# Patient Record
Sex: MALE | Race: WHITE | URBAN - NONMETROPOLITAN AREA
[De-identification: names, ages, dates, MRNs, and addresses within clinical notes are randomized per-mention and may not be internally consistent; named-entity substitution may affect disease eponyms.]

---

## 2017-07-06 ENCOUNTER — AMBULATORY - GICH (OUTPATIENT)
Dept: SCHEDULING | Facility: OTHER | Age: 57
End: 2017-07-06

## 2017-07-07 ENCOUNTER — HISTORY (OUTPATIENT)
Dept: UROLOGY | Facility: OTHER | Age: 57
End: 2017-07-07

## 2017-07-07 ENCOUNTER — AMBULATORY - GICH (OUTPATIENT)
Dept: UROLOGY | Facility: OTHER | Age: 57
End: 2017-07-07

## 2017-12-16 ENCOUNTER — HISTORY (OUTPATIENT)
Dept: EMERGENCY MEDICINE | Facility: OTHER | Age: 57
End: 2017-12-16

## 2017-12-17 ENCOUNTER — HISTORY (OUTPATIENT)
Dept: EMERGENCY MEDICINE | Facility: OTHER | Age: 57
End: 2017-12-17

## 2018-01-29 ENCOUNTER — DOCUMENTATION ONLY (OUTPATIENT)
Dept: FAMILY MEDICINE | Facility: OTHER | Age: 58
End: 2018-01-29

## 2018-01-29 RX ORDER — HYDROCODONE BITARTRATE AND ACETAMINOPHEN 5; 325 MG/1; MG/1
1 TABLET ORAL EVERY 4 HOURS PRN
COMMUNITY
Start: 2017-12-16

## 2018-01-29 RX ORDER — ALBUTEROL SULFATE 90 UG/1
2 AEROSOL, METERED RESPIRATORY (INHALATION) EVERY 6 HOURS PRN
COMMUNITY
Start: 2017-07-07

## 2018-01-29 RX ORDER — ACETAMINOPHEN 325 MG/1
650 TABLET ORAL EVERY 4 HOURS PRN
COMMUNITY
Start: 2017-07-07

## 2018-01-29 RX ORDER — METOPROLOL TARTRATE 50 MG
50 TABLET ORAL 2 TIMES DAILY
COMMUNITY
Start: 2017-07-07

## 2018-01-29 RX ORDER — AMLODIPINE BESYLATE 5 MG/1
5 TABLET ORAL DAILY
COMMUNITY
Start: 2017-07-07

## 2025-01-11 ENCOUNTER — APPOINTMENT (OUTPATIENT)
Dept: CT IMAGING | Facility: OTHER | Age: 65
End: 2025-01-11
Attending: NURSE PRACTITIONER
Payer: COMMERCIAL

## 2025-01-11 ENCOUNTER — APPOINTMENT (OUTPATIENT)
Dept: CT IMAGING | Facility: OTHER | Age: 65
End: 2025-01-11
Attending: FAMILY MEDICINE
Payer: COMMERCIAL

## 2025-01-11 ENCOUNTER — HOSPITAL ENCOUNTER (EMERGENCY)
Facility: OTHER | Age: 65
Discharge: OTHER INSTITUTION | End: 2025-01-11
Attending: FAMILY MEDICINE
Payer: COMMERCIAL

## 2025-01-11 VITALS
HEART RATE: 68 BPM | OXYGEN SATURATION: 99 % | DIASTOLIC BLOOD PRESSURE: 76 MMHG | RESPIRATION RATE: 13 BRPM | TEMPERATURE: 99.4 F | WEIGHT: 165.34 LBS | SYSTOLIC BLOOD PRESSURE: 165 MMHG

## 2025-01-11 DIAGNOSIS — I63.9 ACUTE CVA (CEREBROVASCULAR ACCIDENT) (H): ICD-10-CM

## 2025-01-11 DIAGNOSIS — J96.01 ACUTE RESPIRATORY FAILURE WITH HYPOXIA (H): ICD-10-CM

## 2025-01-11 PROBLEM — R94.31 QT PROLONGATION: Status: ACTIVE | Noted: 2017-07-05

## 2025-01-11 PROBLEM — E11.8 CONTROLLED TYPE 2 DIABETES MELLITUS WITH COMPLICATION, WITHOUT LONG-TERM CURRENT USE OF INSULIN (H): Status: ACTIVE | Noted: 2019-10-27

## 2025-01-11 PROBLEM — R80.9 PROTEINURIA, UNSPECIFIED TYPE: Status: ACTIVE | Noted: 2018-12-11

## 2025-01-11 PROBLEM — G89.29 CHRONIC BILATERAL LOW BACK PAIN WITHOUT SCIATICA: Status: ACTIVE | Noted: 2018-05-08

## 2025-01-11 PROBLEM — I69.398 ABNORMALITY OF GAIT FOLLOWING CEREBROVASCULAR ACCIDENT (CVA): Status: ACTIVE | Noted: 2019-04-04

## 2025-01-11 PROBLEM — N26.1 LEFT RENAL ATROPHY: Status: ACTIVE | Noted: 2018-12-11

## 2025-01-11 PROBLEM — N18.30 CKD (CHRONIC KIDNEY DISEASE) STAGE 3, GFR 30-59 ML/MIN (H): Status: ACTIVE | Noted: 2019-04-01

## 2025-01-11 PROBLEM — G62.9 PERIPHERAL NEUROPATHY: Status: ACTIVE | Noted: 2018-01-05

## 2025-01-11 PROBLEM — M54.50 CHRONIC BILATERAL LOW BACK PAIN WITHOUT SCIATICA: Status: ACTIVE | Noted: 2018-05-08

## 2025-01-11 PROBLEM — J44.9 COPD (CHRONIC OBSTRUCTIVE PULMONARY DISEASE) (H): Status: ACTIVE | Noted: 2019-12-23

## 2025-01-11 PROBLEM — R26.9 ABNORMALITY OF GAIT FOLLOWING CEREBROVASCULAR ACCIDENT (CVA): Status: ACTIVE | Noted: 2019-04-04

## 2025-01-11 PROBLEM — I50.32 CHRONIC DIASTOLIC CONGESTIVE HEART FAILURE (H): Status: ACTIVE | Noted: 2017-10-20

## 2025-01-11 PROBLEM — S36.00XA SPLEEN INJURY: Status: ACTIVE | Noted: 2017-10-13

## 2025-01-11 PROBLEM — D75.89 MACROCYTOSIS WITHOUT ANEMIA: Status: ACTIVE | Noted: 2017-07-05

## 2025-01-11 PROBLEM — I45.2 BIFASCICULAR BUNDLE BRANCH BLOCK: Status: ACTIVE | Noted: 2017-07-05

## 2025-01-11 PROBLEM — G60.8 POLYNEUROPATHY, PERIPHERAL SENSORIMOTOR AXONAL: Status: ACTIVE | Noted: 2025-01-11

## 2025-01-11 LAB
ABO + RH BLD: NORMAL
ALBUMIN SERPL BCG-MCNC: 4.5 G/DL (ref 3.5–5.2)
ALBUMIN UR-MCNC: 20 MG/DL
ALP SERPL-CCNC: 105 U/L (ref 40–150)
ALT SERPL W P-5'-P-CCNC: 101 U/L (ref 0–70)
AMMONIA PLAS-SCNC: 26 UMOL/L (ref 16–60)
AMPHETAMINES UR QL SCN: NORMAL
ANION GAP SERPL CALCULATED.3IONS-SCNC: 13 MMOL/L (ref 7–15)
APPEARANCE UR: CLEAR
APTT PPP: 22 SECONDS (ref 22–38)
AST SERPL W P-5'-P-CCNC: 73 U/L (ref 0–45)
BARBITURATES UR QL SCN: NORMAL
BASOPHILS # BLD AUTO: 0 10E3/UL (ref 0–0.2)
BASOPHILS NFR BLD AUTO: 0 %
BENZODIAZ UR QL SCN: NORMAL
BILIRUB SERPL-MCNC: 0.5 MG/DL
BILIRUB UR QL STRIP: NEGATIVE
BLD GP AB SCN SERPL QL: NEGATIVE
BUN SERPL-MCNC: 29.3 MG/DL (ref 8–23)
BZE UR QL SCN: NORMAL
CALCIUM SERPL-MCNC: 8.5 MG/DL (ref 8.8–10.4)
CANNABINOIDS UR QL SCN: NORMAL
CHLORIDE SERPL-SCNC: 97 MMOL/L (ref 98–107)
COLOR UR AUTO: ABNORMAL
CREAT SERPL-MCNC: 1.54 MG/DL (ref 0.67–1.17)
EGFRCR SERPLBLD CKD-EPI 2021: 50 ML/MIN/1.73M2
EOSINOPHIL # BLD AUTO: 0.1 10E3/UL (ref 0–0.7)
EOSINOPHIL NFR BLD AUTO: 1 %
ERYTHROCYTE [DISTWIDTH] IN BLOOD BY AUTOMATED COUNT: 14.3 % (ref 10–15)
ETHANOL SERPL-MCNC: <0.01 G/DL
FENTANYL UR QL: NORMAL
GLUCOSE BLDC GLUCOMTR-MCNC: 173 MG/DL (ref 70–99)
GLUCOSE SERPL-MCNC: 191 MG/DL (ref 70–99)
GLUCOSE UR STRIP-MCNC: NEGATIVE MG/DL
HCO3 SERPL-SCNC: 25 MMOL/L (ref 22–29)
HCT VFR BLD AUTO: 42.4 % (ref 40–53)
HGB BLD-MCNC: 14.4 G/DL (ref 13.3–17.7)
HGB UR QL STRIP: NEGATIVE
HOLD SPECIMEN: NORMAL
IMM GRANULOCYTES # BLD: 0 10E3/UL
IMM GRANULOCYTES NFR BLD: 0 %
INR PPP: 0.95 (ref 0.85–1.15)
KETONES UR STRIP-MCNC: NEGATIVE MG/DL
LACTATE SERPL-SCNC: 0.7 MMOL/L (ref 0.7–2)
LEUKOCYTE ESTERASE UR QL STRIP: NEGATIVE
LIPASE SERPL-CCNC: 31 U/L (ref 13–60)
LYMPHOCYTES # BLD AUTO: 1.6 10E3/UL (ref 0.8–5.3)
LYMPHOCYTES NFR BLD AUTO: 16 %
MAGNESIUM SERPL-MCNC: 1.6 MG/DL (ref 1.7–2.3)
MCH RBC QN AUTO: 33.8 PG (ref 26.5–33)
MCHC RBC AUTO-ENTMCNC: 34 G/DL (ref 31.5–36.5)
MCV RBC AUTO: 100 FL (ref 78–100)
MONOCYTES # BLD AUTO: 0.5 10E3/UL (ref 0–1.3)
MONOCYTES NFR BLD AUTO: 5 %
NEUTROPHILS # BLD AUTO: 7.8 10E3/UL (ref 1.6–8.3)
NEUTROPHILS NFR BLD AUTO: 78 %
NITRATE UR QL: NEGATIVE
NRBC # BLD AUTO: 0 10E3/UL
NRBC BLD AUTO-RTO: 0 /100
OPIATES UR QL SCN: NORMAL
PCP QUAL URINE (ROCHE): NORMAL
PH UR STRIP: 6 [PH] (ref 5–9)
PLATELET # BLD AUTO: 112 10E3/UL (ref 150–450)
POTASSIUM SERPL-SCNC: 4.4 MMOL/L (ref 3.4–5.3)
PROT SERPL-MCNC: 7.4 G/DL (ref 6.4–8.3)
RBC # BLD AUTO: 4.26 10E6/UL (ref 4.4–5.9)
RBC URINE: 1 /HPF
SODIUM SERPL-SCNC: 135 MMOL/L (ref 135–145)
SP GR UR STRIP: 1.02 (ref 1–1.03)
SPECIMEN EXP DATE BLD: NORMAL
TROPONIN T SERPL HS-MCNC: 29 NG/L
UROBILINOGEN UR STRIP-MCNC: NORMAL MG/DL
WBC # BLD AUTO: 10 10E3/UL (ref 4–11)
WBC URINE: <1 /HPF

## 2025-01-11 PROCEDURE — 31500 INSERT EMERGENCY AIRWAY: CPT | Performed by: FAMILY MEDICINE

## 2025-01-11 PROCEDURE — 82140 ASSAY OF AMMONIA: CPT | Performed by: FAMILY MEDICINE

## 2025-01-11 PROCEDURE — 82077 ASSAY SPEC XCP UR&BREATH IA: CPT | Performed by: FAMILY MEDICINE

## 2025-01-11 PROCEDURE — 250N000011 HC RX IP 250 OP 636: Performed by: FAMILY MEDICINE

## 2025-01-11 PROCEDURE — 84484 ASSAY OF TROPONIN QUANT: CPT | Performed by: NURSE PRACTITIONER

## 2025-01-11 PROCEDURE — 70496 CT ANGIOGRAPHY HEAD: CPT

## 2025-01-11 PROCEDURE — 36415 COLL VENOUS BLD VENIPUNCTURE: CPT | Performed by: FAMILY MEDICINE

## 2025-01-11 PROCEDURE — 83605 ASSAY OF LACTIC ACID: CPT | Performed by: FAMILY MEDICINE

## 2025-01-11 PROCEDURE — 99291 CRITICAL CARE FIRST HOUR: CPT | Mod: 25 | Performed by: FAMILY MEDICINE

## 2025-01-11 PROCEDURE — 250N000009 HC RX 250: Performed by: NURSE PRACTITIONER

## 2025-01-11 PROCEDURE — 85610 PROTHROMBIN TIME: CPT | Performed by: NURSE PRACTITIONER

## 2025-01-11 PROCEDURE — 83690 ASSAY OF LIPASE: CPT | Performed by: FAMILY MEDICINE

## 2025-01-11 PROCEDURE — 82040 ASSAY OF SERUM ALBUMIN: CPT | Performed by: FAMILY MEDICINE

## 2025-01-11 PROCEDURE — 87486 CHLMYD PNEUM DNA AMP PROBE: CPT | Performed by: FAMILY MEDICINE

## 2025-01-11 PROCEDURE — 250N000011 HC RX IP 250 OP 636: Performed by: NURSE PRACTITIONER

## 2025-01-11 PROCEDURE — 96365 THER/PROPH/DIAG IV INF INIT: CPT | Mod: XU | Performed by: FAMILY MEDICINE

## 2025-01-11 PROCEDURE — 96368 THER/DIAG CONCURRENT INF: CPT | Mod: XU | Performed by: FAMILY MEDICINE

## 2025-01-11 PROCEDURE — 70450 CT HEAD/BRAIN W/O DYE: CPT

## 2025-01-11 PROCEDURE — 86900 BLOOD TYPING SEROLOGIC ABO: CPT | Performed by: FAMILY MEDICINE

## 2025-01-11 PROCEDURE — 85730 THROMBOPLASTIN TIME PARTIAL: CPT | Performed by: NURSE PRACTITIONER

## 2025-01-11 PROCEDURE — 80048 BASIC METABOLIC PNL TOTAL CA: CPT | Performed by: FAMILY MEDICINE

## 2025-01-11 PROCEDURE — 85025 COMPLETE CBC W/AUTO DIFF WBC: CPT | Performed by: FAMILY MEDICINE

## 2025-01-11 PROCEDURE — 87581 M.PNEUMON DNA AMP PROBE: CPT | Performed by: FAMILY MEDICINE

## 2025-01-11 PROCEDURE — 250N000009 HC RX 250: Performed by: FAMILY MEDICINE

## 2025-01-11 PROCEDURE — 86850 RBC ANTIBODY SCREEN: CPT | Performed by: FAMILY MEDICINE

## 2025-01-11 PROCEDURE — 93005 ELECTROCARDIOGRAM TRACING: CPT | Mod: XU | Performed by: FAMILY MEDICINE

## 2025-01-11 PROCEDURE — 71260 CT THORAX DX C+: CPT

## 2025-01-11 PROCEDURE — 83735 ASSAY OF MAGNESIUM: CPT | Performed by: FAMILY MEDICINE

## 2025-01-11 PROCEDURE — 36415 COLL VENOUS BLD VENIPUNCTURE: CPT | Performed by: NURSE PRACTITIONER

## 2025-01-11 PROCEDURE — 87633 RESP VIRUS 12-25 TARGETS: CPT | Performed by: FAMILY MEDICINE

## 2025-01-11 PROCEDURE — 258N000003 HC RX IP 258 OP 636: Performed by: FAMILY MEDICINE

## 2025-01-11 PROCEDURE — 82962 GLUCOSE BLOOD TEST: CPT

## 2025-01-11 PROCEDURE — 93010 ELECTROCARDIOGRAM REPORT: CPT | Performed by: INTERNAL MEDICINE

## 2025-01-11 PROCEDURE — 80307 DRUG TEST PRSMV CHEM ANLYZR: CPT | Performed by: FAMILY MEDICINE

## 2025-01-11 PROCEDURE — 81001 URINALYSIS AUTO W/SCOPE: CPT | Performed by: FAMILY MEDICINE

## 2025-01-11 RX ORDER — PROPOFOL 10 MG/ML
5-75 INJECTION, EMULSION INTRAVENOUS CONTINUOUS
Status: DISCONTINUED | OUTPATIENT
Start: 2025-01-11 | End: 2025-01-11 | Stop reason: HOSPADM

## 2025-01-11 RX ORDER — AZITHROMYCIN 500 MG/5ML
500 INJECTION, POWDER, LYOPHILIZED, FOR SOLUTION INTRAVENOUS ONCE
Status: COMPLETED | OUTPATIENT
Start: 2025-01-11 | End: 2025-01-11

## 2025-01-11 RX ORDER — ETOMIDATE 2 MG/ML
20 INJECTION INTRAVENOUS ONCE
Status: COMPLETED | OUTPATIENT
Start: 2025-01-11 | End: 2025-01-11

## 2025-01-11 RX ORDER — IOPAMIDOL 755 MG/ML
67 INJECTION, SOLUTION INTRAVASCULAR ONCE
Status: COMPLETED | OUTPATIENT
Start: 2025-01-11 | End: 2025-01-11

## 2025-01-11 RX ORDER — ACETAMINOPHEN 325 MG/1
650 TABLET ORAL EVERY 4 HOURS PRN
Status: DISCONTINUED | OUTPATIENT
Start: 2025-01-11 | End: 2025-01-11 | Stop reason: HOSPADM

## 2025-01-11 RX ORDER — ACETAMINOPHEN 650 MG/1
650 SUPPOSITORY RECTAL EVERY 4 HOURS PRN
Status: DISCONTINUED | OUTPATIENT
Start: 2025-01-11 | End: 2025-01-11 | Stop reason: HOSPADM

## 2025-01-11 RX ORDER — PIPERACILLIN SODIUM, TAZOBACTAM SODIUM 3; .375 G/15ML; G/15ML
3.38 INJECTION, POWDER, LYOPHILIZED, FOR SOLUTION INTRAVENOUS ONCE
Status: COMPLETED | OUTPATIENT
Start: 2025-01-11 | End: 2025-01-11

## 2025-01-11 RX ADMIN — PROPOFOL 10 MCG/KG/MIN: 10 INJECTION, EMULSION INTRAVENOUS at 12:14

## 2025-01-11 RX ADMIN — AZITHROMYCIN MONOHYDRATE 500 MG: 500 INJECTION, POWDER, LYOPHILIZED, FOR SOLUTION INTRAVENOUS at 12:36

## 2025-01-11 RX ADMIN — IOPAMIDOL 75 ML: 755 INJECTION, SOLUTION INTRAVENOUS at 12:02

## 2025-01-11 RX ADMIN — ROCURONIUM BROMIDE 50 MG: 10 INJECTION, SOLUTION INTRAVENOUS at 11:34

## 2025-01-11 RX ADMIN — PIPERACILLIN AND TAZOBACTAM 3.38 G: 3; .375 INJECTION, POWDER, LYOPHILIZED, FOR SOLUTION INTRAVENOUS at 12:12

## 2025-01-11 RX ADMIN — SODIUM CHLORIDE 100 ML: 9 INJECTION, SOLUTION INTRAVENOUS at 12:03

## 2025-01-11 RX ADMIN — ETOMIDATE 20 MG: 2 INJECTION, SOLUTION INTRAVENOUS at 11:33

## 2025-01-11 ASSESSMENT — ACTIVITIES OF DAILY LIVING (ADL)
ADLS_ACUITY_SCORE: 41
ADLS_ACUITY_SCORE: 41

## 2025-01-11 NOTE — CONSULTS
"  [facility]    Stroke Telephone Note    I was called by Saida Reddy on 01/11/25 regarding patient Josué Seymour. The patient is a 64 year old male smoker, HTN, HLD, sp right carotid endarterectomy presents with difficulty breathing AMS and left sided weakness. Code stroke activated . CTH : hpodensiti noted on the ri MCA EULALIO territory. CTA right ICA occlusion, new from 2019. This in association with the cortical subcortical loss of differentiation on the right hemisphere brings concern for acute ICA occlusion. Patient needs to be transfer to a MT capable center for intervention.      Vitals  BP: (!) 187/91   Pulse: 90   Resp: 25   Temp: 99.4  F (37.4  C)   Weight: 75 kg (165 lb 5.5 oz)        Impression  Sudden onset AMS, Left sided weakness. MARIA occlusion     Recommendations  Ctp STAT  Emergent Transfer to MT capable center for MT consideration. Patient being transfer to [place] for respiratory condition. Needs evaluation for MT consideration urgently   Avoid hypotension, Keep MAP > 80  Permissive BP control       My recommendations are based on the information provided over the phone by Josué Seymour's in-person providers. They are not intended to replace the clinical judgment of his in-person providers. I was not requested to personally see or examine the patient at this time.     Severino Bhatti MD  Vascular Neurology    To page me or covering stroke neurology team member, click here: [facility]  Choose \"On Call\" tab at top, then select \"NEUROLOGY/ALL SITES\" from middle drop-down box, press Enter, then look for \"stroke\" or \"telestroke\" for your site.    "

## 2025-01-11 NOTE — ED TRIAGE NOTES
ED Nursing Triage Note (General)   ________________________________    Josué Seymour is a 64 year old Male that presents to triage via [facility] with complaints of hypoxia.  EMS arrived on scene with an 02 of 42%.  Patient placed on a high flow non-rebreather and given a duoneb in route which brought 02 to the 90's.  Patient remains lethargic with no pain response to the L) side.  Patient is unable to answer questions on arrival.  Hx of COPD, HIV, and hepatitis per EMS.   Significant symptoms had onset 1 hour(s) ago.  Vital signs:  Temp: (!) 100.7  F (38.2  C) Temp src: Tympanic BP: (!) 187/91 Pulse: 90   Resp: 25 SpO2: 100 %       Weight: 75 kg (165 lb 5.5 oz)  There is no height or weight on file to calculate BMI.       PRE HOSPITAL PRIOR LIVING SITUATION Alone      Triage Assessment (Adult)       Row Name 01/11/25 1120          Triage Assessment    Airway WDL X        Respiratory WDL    Respiratory WDL X        Skin Circulation/Temperature WDL    Skin Circulation/Temperature WDL X        Cardiac WDL    Cardiac WDL WDL        Peripheral/Neurovascular WDL    Peripheral Neurovascular WDL X        Cognitive/Neuro/Behavioral WDL    Cognitive/Neuro/Behavioral WDL X     Level of Consciousness confused;lethargic        [place] Coma Scale    Best Eye Response 2-->(E2) to pain     Best Motor Response 4-->(M4) withdraws from pain     Best Verbal Response 3-->(V3) inappropriate words     Jaime Coma Scale Score 9

## 2025-01-11 NOTE — PROGRESS NOTES
RT assisted in intubation. Pt was intubated with an 8.0 ETT placed 24 cm at the lips. Colorimetry, end tidal, lung auscultation, and CXR used to verify tube placement. RT manually ventilated pt to CT scan and back. When pt got back to ER, RT continued to manually ventilate until transport team placed pt on their ventilator. No further interventions.    RT Сергей

## 2025-01-11 NOTE — ED NOTES
Upon arrival, patient is unresponsive to painful stimuli.  Unable to perform any neurologic assessment.  Plans for intubation.  Nursing, RT, MD, lab, pharmacy and imaging in room.

## 2025-01-11 NOTE — ED NOTES
Attempted to contact patient's brother Choco to let him know of patient's disposition.  No response on either number provided.  Per medics, brother was on scene when patient transferred but that the two were estranged.  If patient's brother calls back, limited information would be given as consent was not able to be obtained from patient to share information due to patient's medical condition.

## 2025-01-11 NOTE — ED NOTES
Patient arrived to ER unresponsive to painful stimuli at 1120 via EMS; he was not moving any extremities, no verbal output.  .  Tylenol given IV en route to our facility.  Difficulty initially with IV placement.  Patient wheezing and labored breathing.  He has facial flushing.  At 1135, patient was successfully intubated without difficulty.  20 of etomidate and 50 of ben given for sedation.  Tube is 24 at the lip, 8.0cm.  LS more dim to the right than to the left but wheezing heard prominently on the right.  Pulses bounding.  At 1137, patient transferred to CT.  After completion of imaging, VS remained stable and norris catheter was placed with only 125 of clear urine noted in norris.  At 1230, Flight Crew in room.  Zithromax handed off to flight crew to be infused.  Patient opening eyes and propofol drip increased to 30mcg/kg/hr.  RESP: CMV, tidal volume 475, rate of 14,and an FiO2 of 50 via flight crew prior to transfer. No changes of neurologic status throughout patient's stay.

## 2025-01-11 NOTE — ED PROVIDER NOTES
History     Chief Complaint   Patient presents with    Altered Mental Status    Shortness of Breath     HPI  Josué Seymour is a 64 year old male who presented obtunded in respiratory distress.  GCS 6 on arrival.  Patient was immediately intubated.  Unable to obtain history though we have been told he has COPD.  He was hypoxic in the 40% range when EMS found him.  There is also concern as he has not been responding much on the left side if he possibly had a stroke.  Given concerns for all the above after he was intubated he was immediately sent to the CT scanner under tier 1 stroke code.  I have a call out to [facility] for transfer to higher level of care at this time.    Patient had been given 2 albuterol, 1 DuoNeb, 125 mg of Solu-Medrol and was on 8 L of oxygen and route.  His sats improved to the 99% range but he remained obtunded as noted above.    Sepsis Evaluation     I was called to see Josué Seymour due to abnormal vital signs triggering the Sepsis SIRS screening alert. He is known to have an infection.     PHYSICAL EXAM  Vital Signs:  Temp: 99.4  F (37.4  C) Temp src: Tympanic BP: (!) 187/91 Pulse: 90   Resp: 25 SpO2: 100 %        General: acutely ill appearing  Mental Status: altered level of consciousness based on confused, altered .     Remainder of physical exam is significant for coarse lung sounds, rales, respiratory distress    ASSESSMENT AND PLAN    The patient has signs of sepsis as evidenced by:  1. Presence of 2 SIRS criteria, suspected infection, AND  2. Organ dysfunction: Acute encephalopathy due to sepsis and Acute respiratory or ventilatory failure with new need for positive pressure ventilation due to sepsis    Time zero: 12:04 PM  on 01/11/25 as this was the time when    resulted, raising suspicion for bacterial infection.    Note: Due to a national blood culture bottle shortage, reduced blood cultures may have been drawn on this patient.    Lactic Acid Results:  Recent Labs   Lab Test  "01/11/25  1145   LACT 0.7      BMI Readings from Last 1 Encounters:   No data found for BMI      Anti-infectives (From now, onward)      Start     Dose/Rate Route Frequency Ordered Stop    01/11/25 1230  piperacillin-tazobactam (ZOSYN) 3.375 g vial to attach to  mL bag        Note to Pharmacy: For [facility], [facility] and [facility]: For Zosyn-naive patients, use the \"Zosyn initial dose + extended infusion\" order panel.    3.375 g  over 30 Minutes Intravenous ONCE 01/11/25 1145      01/11/25 1230  azithromycin (ZITHROMAX) 500 mg in  mL intermittent infusion         500 mg  over 1 Hours Intravenous ONCE 01/11/25 1145              3 Hour Bundle  Blood Cultures before IV Antibiotics: Yes  Current antibiotic coverage is appropriate for source of infection.  Total fluids given (ED +Pre-hospital):  The patient responded to a lesser volume of IV fluids. The initial volume ordered was 1000 mL.     6 Hour Bundle (Reassessment)  Repeat Lactic Acid Level: Ordered by reflex for 2 hours after initial lactic acid collection.  Vasopressors: MAP>65 after initial IVF bolus, will continue to monitor fluid status and vital signs.  Repeat perfusion exam:  I attest to having performed a repeat sepsis exam and assessment of perfusion at .now.    Disposition: The patient will remain on the current unit. We will continue to monitor this patient closely..      Saida Reddy DO  01/11/25, 11:53 AM      Allergies:  Not on File    Problem List:    Patient Active Problem List    Diagnosis Date Noted    Polyneuropathy, peripheral sensorimotor axonal 01/11/2025     Priority: Medium    COPD (chronic obstructive pulmonary disease) (H) 12/23/2019     Priority: Medium     4/3/2019 Hospital discharge summary:  Patient has Chronic obstructive pulmonary disease/cough and tobacco abuse. He is ready to quit Start with the nicotine patch.  Consider chantix if unsuccessful. He has an albuterol MDI. CXR was negative      Controlled type 2 diabetes mellitus with " complication, without long-term current use of insulin (H) 10/27/2019     Priority: Medium    Abnormality of gait following cerebrovascular accident (CVA) 04/04/2019     Priority: Medium    CKD (chronic kidney disease) stage 3, GFR 30-59 ml/min (H) 04/01/2019     Priority: Medium    CVA (cerebral vascular accident) (H) 04/01/2019     Priority: Medium    Proteinuria, unspecified type 12/11/2018     Priority: Medium    Left renal atrophy 12/11/2018     Priority: Medium    Chronic bilateral low back pain without sciatica 05/08/2018     Priority: Medium    Peripheral neuropathy 01/05/2018     Priority: Medium    Chronic diastolic congestive heart failure (H) 10/20/2017     Priority: Medium    Spleen injury 10/13/2017     Priority: Medium     Possible grade 1      Bifascicular bundle branch block 07/05/2017     Priority: Medium    QT prolongation 07/05/2017     Priority: Medium    Macrocytosis without anemia 07/05/2017     Priority: Medium    HTN (hypertension) 11/05/2014     Priority: Medium        Past Medical History:    Past Medical History:   Diagnosis Date    Abnormal electrocardiogram     Abnormal levels of other serum enzymes     Abnormality of plasma protein     Acute pancreatitis without infection or necrosis     Bifascicular block     Calculus of gallbladder without cholecystitis without obstruction     Cerebral infarction (H)     Dehydration     Epigastric pain     Essential (primary) hypertension     Hematuria     Hypertensive urgency     Hypokalemia     Malignant neoplasm of bladder (H)     Muscle spasm of back     Nicotine dependence, uncomplicated     Other specified abnormal findings of blood chemistry     Other specified abnormal findings of blood chemistry     Other specified diseases of blood and blood-forming organs     Pure hyperglyceridemia     Restless legs syndrome        Past Surgical History:    Past Surgical History:   Procedure Laterality Date    OTHER SURGICAL HISTORY       2014,[id number],BLADDER SURGERY,tumor resection-- records from [facility]    OTHER SURGICAL HISTORY      02/27/2015,[id number],CAROTID ENDARTERECTOMY,Right,Records from [facility]    OTHER SURGICAL HISTORY      08/28/2009,80554.0,[place] EGD TRANSORAL DIAGNOSTIC,records from [facility]    OTHER SURGICAL HISTORY      08/03/2009,48252.0,[place] LAP CHOLECYSTECTOMY/GRAPH,[facility]    TONSILLECTOMY      Records from [facility]       Family History:    No family history on file.    Social History:  Marital Status:   [2]  Social History     Tobacco Use    Smoking status: Former    Smokeless tobacco: Never   Substance Use Topics    Alcohol use: No    Drug use: Unknown     Types: Other     Comment: Drug use: No        Medications:    acetaminophen (TYLENOL) 325 MG tablet  albuterol (PROAIR HFA/PROVENTIL HFA/VENTOLIN HFA) 108 (90 BASE) MCG/ACT Inhaler  amLODIPine (NORVASC) 5 MG tablet  aspirin  MG EC tablet  HYDROcodone-acetaminophen (NORCO) 5-325 MG per tablet  metoprolol tartrate (LOPRESSOR) 50 MG tablet  ranitidine (ZANTAC) 300 MG tablet          Review of Systems   Unable to perform ROS: Patient unresponsive       Physical Exam   BP: (!) 187/91  Pulse: 90  Temp: (!) 100.7  F (38.2  C)  Resp: 25  Weight: 75 kg (165 lb 5.5 oz)  SpO2: 100 %      Physical Exam  Vitals and nursing note reviewed.   Constitutional:       General: He is in acute distress.      Appearance: He is ill-appearing.      Interventions: He is intubated.      Comments: Acutely and chronically ill-appearing with respiratory distress.   HENT:      Head: Normocephalic and atraumatic.   Eyes:      Pupils: Pupils are equal, round, and reactive to light.   Cardiovascular:      Rate and Rhythm: Normal rate.   Pulmonary:      Effort: Tachypnea, accessory muscle usage, prolonged expiration, respiratory distress and retractions present. He is intubated.      Breath sounds: Decreased air movement present. Decreased breath sounds and wheezing present. No rhonchi or rales.    Musculoskeletal:      Cervical back: Neck supple.   Neurological:      Mental Status: He is confused and unresponsive.      GCS: GCS eye subscore is 2. GCS verbal subscore is 1.      Motor: Weakness present. No tremor.         ED Course     ED Course as of 01/11/25 1241   Sat Jan 11, 2025   1138 Pt obtunded. RSI and intubated with 8.0 ETT. Sent to CT scanner   1145 Will give a dose of antibiotics at this time.  Blood cultures have been drawn.   1149 Spoke with [facility]. They have beds available. Will awaiting labs/imaging and call back. Flight available.   1231 Call from Rad and stroke neuro. Concern for acute occlusion causing symptoms. Recommend IR and higher level of care at this time.      Procedures              EKG Interpretation:      Interpreted by Saida Reddy DO  Time reviewed:   Symptoms at time of EKG: respiratory distress   Rate: Normal  Axis: Left Axis Deviation  Ectopy: premature ventricular contractions (unifocal)  Conduction: bifasicular  ST Segments/ T Waves: No acute ischemic changes  Comparison to prior: No old EKG available            Critical Care time:  was 90 minutes for this patient excluding procedures.     The patient has signs of sepsis   Sepsis ED evaluation   The patient has signs of sepsis as evidenced by:  1. Presence of 2 SIRS criteria, suspected infection, AND  2. Organ dysfunction: Acute encephalopathy due to sepsis and Acute respiratory or ventilatory failure with new need for positive pressure ventilation due to sepsis      Lactic Acid Results:  Recent Labs   Lab Test 01/11/25  1145   LACT 0.7       3 Hour Bundle 6 Hour Bundle (Reassessment)   Blood Cultures before IV Antibiotics: Yes  Antibiotics given: see below  Prehospital fluid volume (mL):                     Total fluids given (ED +Pre-hospital):  The patient responded to a lesser volume of IV fluids. The initial volume ordered was 1000 mL.    Repeat Lactic Acid Level: Ordered by reflex for 2 hours after initial  "lactic acid collection.  Vasopressors: MAP>65 after initial IVF bolus, will continue to monitor fluid status and vital signs.  Repeat perfusion exam: I attest to having performed a repeat sepsis exam and assessment of perfusion at 12:09 PM .   BMI Readings from Last 1 Encounters:   No data found for BMI       Anti-infectives (From admission through now)      Start     Dose/Rate Route Frequency Ordered Stop    01/11/25 1230  piperacillin-tazobactam (ZOSYN) 3.375 g vial to attach to  mL bag        Note to Pharmacy: For [facility], [facility] and [facility]: For Zosyn-naive patients, use the \"Zosyn initial dose + extended infusion\" order panel.    3.375 g  over 30 Minutes Intravenous ONCE 01/11/25 1145      01/11/25 1230  azithromycin (ZITHROMAX) 500 mg in  mL intermittent infusion         500 mg  over 1 Hours Intravenous ONCE 01/11/25 1145               and The patient has stroke symptoms:         ED Stroke specific documentation           NIHSS PDF     Patient last known well time: 1045am      Thrombolytics:   Not given due to:   - established infarct on imaging    If treating with thrombolytics: Ensure SBP<180 and DBP<105 prior to treatment with thrombolytics.  Administering thrombolytics after treatment with IV labetalol, hydralazine, or nicardipine is reasonable once BP control is established.    Endovascular Retrieval:  Will need transfer    [employer] Stroke Scale (Baseline). Unable to assess. GCS 6.     Stroke Mimics were considered (including migraine headache, seizure disorder, hypoglycemia (or hyperglycemia), head or spinal trauma, CNS infection, Toxin ingestion and shock state (e.g. sepsis) .    Evaluation/Treatement was delayed due to: need for CT results, intubation of unstable patient. Need to transfer to higher level of care for treatment.             Results for orders placed or performed during the hospital encounter of 01/11/25 (from the past 24 hours)   Glucose by meter   Result Value Ref " Range    GLUCOSE BY METER POCT 173 (H) 70 - 99 mg/dL   CBC with platelets differential    Narrative    The following orders were created for panel order CBC with platelets differential.  Procedure                               Abnormality         Status                     ---------                               -----------         ------                     CBC with platelets and d...[[id number]]  Abnormal            Final result                 Please view results for these tests on the individual orders.   Lactic acid whole blood with 1x repeat in 2 hr when >2   Result Value Ref Range    Lactic Acid, Initial 0.7 0.7 - 2.0 mmol/L   Ammonia   Result Value Ref Range    Ammonia 26 16 - 60 umol/L   INR   Result Value Ref Range    INR 0.95 0.85 - 1.15   Partial thromboplastin time   Result Value Ref Range    aPTT 22 22 - 38 Seconds   Troponin T, High Sensitivity   Result Value Ref Range    Troponin T, High Sensitivity 29 (H) <=22 ng/L   CBC with platelets and differential   Result Value Ref Range    WBC Count 10.0 4.0 - 11.0 10e3/uL    RBC Count 4.26 (L) 4.40 - 5.90 10e6/uL    Hemoglobin 14.4 13.3 - 17.7 g/dL    Hematocrit 42.4 40.0 - 53.0 %     78 - 100 fL    MCH 33.8 (H) 26.5 - 33.0 pg    MCHC 34.0 31.5 - 36.5 g/dL    RDW 14.3 10.0 - 15.0 %    Platelet Count 112 (L) 150 - 450 10e3/uL    % Neutrophils 78 %    % Lymphocytes 16 %    % Monocytes 5 %    % Eosinophils 1 %    % Basophils 0 %    % Immature Granulocytes 0 %    NRBCs per 100 WBC 0 <1 /100    Absolute Neutrophils 7.8 1.6 - 8.3 10e3/uL    Absolute Lymphocytes 1.6 0.8 - 5.3 10e3/uL    Absolute Monocytes 0.5 0.0 - 1.3 10e3/uL    Absolute Eosinophils 0.1 0.0 - 0.7 10e3/uL    Absolute Basophils 0.0 0.0 - 0.2 10e3/uL    Absolute Immature Granulocytes 0.0 <=0.4 10e3/uL    Absolute NRBCs 0.0 10e3/uL   Extra Tube    Narrative    The following orders were created for panel order Extra Tube.  Procedure                               Abnormality         Status                      ---------                               -----------         ------                     Extra Red Top Tube[[id number]]                               In process                   Please view results for these tests on the individual orders.   UA with Microscopic reflex to Culture    Specimen: Urine, Catheter   Result Value Ref Range    Color Urine Light Yellow Colorless, Straw, Light Yellow, Yellow    Appearance Urine Clear Clear    Glucose Urine Negative Negative mg/dL    Bilirubin Urine Negative Negative    Ketones Urine Negative Negative mg/dL    Specific Gravity Urine 1.016 1.000 - 1.030    Blood Urine Negative Negative    pH Urine 6.0 5.0 - 9.0    Protein Albumin Urine 20 (A) Negative mg/dL    Urobilinogen Urine Normal Normal, 2.0 mg/dL    Nitrite Urine Negative Negative    Leukocyte Esterase Urine Negative Negative    RBC Urine 1 <=2 /HPF    WBC Urine <1 <=5 /HPF    Narrative    Urine Culture not indicated   Urine Drug Screen    Narrative    The following orders were created for panel order Urine Drug Screen.  Procedure                               Abnormality         Status                     ---------                               -----------         ------                     Urine Drug Screen Panel[[id number]]      Normal              Final result                 Please view results for these tests on the individual orders.   Urine Drug Screen Panel   Result Value Ref Range    Amphetamines Urine Screen Negative Screen Negative    Barbituates Urine Screen Negative Screen Negative    Benzodiazepine Urine Screen Negative Screen Negative    Cannabinoids Urine Screen Negative Screen Negative    Cocaine Urine Screen Negative Screen Negative    Fentanyl Qual Urine Screen Negative Screen Negative    Opiates Urine Screen Negative Screen Negative    PCP Urine Screen Negative Screen Negative       Medications   acetaminophen (TYLENOL) tablet 650 mg (has no administration in time range)     Or    acetaminophen (TYLENOL) Suppository 650 mg (has no administration in time range)   piperacillin-tazobactam (ZOSYN) 3.375 g vial to attach to  mL bag (3.375 g Intravenous $New Bag 1/11/25 1212)   azithromycin (ZITHROMAX) 500 mg in  mL intermittent infusion (500 mg Intravenous $New Bag 1/11/25 1236)   propofol (DIPRIVAN) infusion (30 mcg/kg/min × 75 kg Intravenous Rate/Dose Change 1/11/25 1234)   etomidate (AMIDATE) injection 20 mg (20 mg Intravenous $Given 1/11/25 1133)   rocuronium injection 50 mg (50 mg Intravenous $Given 1/11/25 1134)   iopamidol (ISOVUE-370) solution 67 mL (75 mLs Intravenous $Given 1/11/25 1202)     And   sodium chloride 0.9 % bag for CT scan flush use (100 mLs As instructed $Given 1/11/25 1203)       Assessments & Plan (with Medical Decision Making)     I have reviewed the nursing notes.    I have reviewed the findings, diagnosis, plan and need for follow up with the patient.     Critical Care Addendum  My initial assessment, based on my review of prehospital provider report, review of nursing observations, review of vital signs, focused history, physical exam, review of cardiac rhythm monitor, 12 lead ECG analysis, and discussion with neurosurg/neurology/CCM , established a high suspicion that Josué Seymour has ischemic or hemorrhagic stroke and altered mental status, which requires immediate intervention, and therefore he is critically ill.     After the initial assessment, the care team initiated multiple lab tests, initiated IV fluid administration, performed advanced airway management, and consulted with neurosurg/neuro/CCM  to provide stabilization care. Due to the critical nature of this patient, I reassessed nursing observations, vital signs, physical exam, review of cardiac rhythm monitor, 12 lead ECG analysis, mental status, neurologic status, and respiratory status multiple times prior to his disposition.     Time also spent performing documentation, discussion with  family to obtain medical information for decision making, reviewing test results, discussion with consultants, and coordination of care.     Critical care time (excluding teaching time and procedures): 90 minutes.           Medical Decision Making  The patient's presentation was of high complexity (an acute health issue posing potential threat to life or bodily function).    The patient's evaluation involved:  review of external note(s) from 1 sources (see separate area of note for details)  review of 3+ test result(s) ordered prior to this encounter (see separate area of note for details)  ordering and/or review of 3+ test(s) in this encounter (see separate area of note for details)    The patient's management necessitated high risk (a decision regarding hospitalization) and high risk (moderate or deep procedural sedation).        New Prescriptions    No medications on file       Final diagnoses:   Acute CVA (cerebrovascular accident) (H)   Acute respiratory failure with hypoxia (H)   Patient presented via EMS.  Initial story was to me that he was not responsive due to hypoxia after treatment with DuoNebs and Solu-Medrol he did become responsive for a period of time.  However, but the time he arrived to the emergency room he was unresponsive with a GCS of 6.  He was intubated with an 8 oh ET tube.  He was immediately sent to the CT scanner for concerns related to both possible stroke and also respiratory symptoms.    At this time CT and CTA were reviewed by radiology and neurology.  There is concern for an acute  CVA.  Plan is to transfer to higher level of care where IR is available for treatment.    Patient also had a low-grade fever, wheezing and very tight air movement throughout, known history of COPD.  For this reason I also gave him Zosyn and azithromycin.  He was intubated as noted above.  He will be transferred by flight to higher level of care    1/11/2025   [facility]  Saida ADAMS,   01/11/25 1245       Saida Redyd,   01/11/25 1322

## 2025-01-12 LAB
C PNEUM DNA SPEC QL NAA+PROBE: NOT DETECTED
FLUAV H1 2009 PAND RNA SPEC QL NAA+PROBE: DETECTED
FLUAV H1 RNA SPEC QL NAA+PROBE: NOT DETECTED
FLUAV H3 RNA SPEC QL NAA+PROBE: NOT DETECTED
FLUAV RNA SPEC QL NAA+PROBE: DETECTED
FLUBV RNA SPEC QL NAA+PROBE: NOT DETECTED
HADV DNA SPEC QL NAA+PROBE: NOT DETECTED
HCOV PNL SPEC NAA+PROBE: NOT DETECTED
HMPV RNA SPEC QL NAA+PROBE: NOT DETECTED
HPIV1 RNA SPEC QL NAA+PROBE: NOT DETECTED
HPIV2 RNA SPEC QL NAA+PROBE: NOT DETECTED
HPIV3 RNA SPEC QL NAA+PROBE: NOT DETECTED
HPIV4 RNA SPEC QL NAA+PROBE: NOT DETECTED
M PNEUMO DNA SPEC QL NAA+PROBE: NOT DETECTED
RSV RNA SPEC QL NAA+PROBE: NOT DETECTED
RSV RNA SPEC QL NAA+PROBE: NOT DETECTED
RV+EV RNA SPEC QL NAA+PROBE: NOT DETECTED

## 2025-01-13 LAB
ATRIAL RATE - MUSE: 85 BPM
DIASTOLIC BLOOD PRESSURE - MUSE: NORMAL MMHG
INTERPRETATION ECG - MUSE: NORMAL
P AXIS - MUSE: 67 DEGREES
PR INTERVAL - MUSE: 192 MS
QRS DURATION - MUSE: 144 MS
QT - MUSE: 428 MS
QTC - MUSE: 509 MS
R AXIS - MUSE: -79 DEGREES
SYSTOLIC BLOOD PRESSURE - MUSE: NORMAL MMHG
T AXIS - MUSE: 56 DEGREES
VENTRICULAR RATE- MUSE: 85 BPM

## 2025-02-20 ENCOUNTER — DOCUMENTATION ONLY (OUTPATIENT)
Dept: OTHER | Facility: CLINIC | Age: 65
End: 2025-02-20
Payer: COMMERCIAL

## 2025-02-24 ENCOUNTER — HOSPITAL ENCOUNTER (EMERGENCY)
Facility: OTHER | Age: 65
Discharge: HOME | End: 2025-02-24
Attending: FAMILY MEDICINE
Payer: COMMERCIAL

## 2025-02-24 VITALS
DIASTOLIC BLOOD PRESSURE: 91 MMHG | HEART RATE: 69 BPM | WEIGHT: 165 LBS | HEIGHT: 70 IN | RESPIRATION RATE: 16 BRPM | OXYGEN SATURATION: 93 % | SYSTOLIC BLOOD PRESSURE: 158 MMHG | BODY MASS INDEX: 23.62 KG/M2

## 2025-02-24 DIAGNOSIS — R04.0 EPISTAXIS: ICD-10-CM

## 2025-02-24 PROCEDURE — 99283 EMERGENCY DEPT VISIT LOW MDM: CPT | Performed by: FAMILY MEDICINE

## 2025-02-24 PROCEDURE — 250N000009 HC RX 250: Performed by: FAMILY MEDICINE

## 2025-02-24 RX ORDER — OXYMETAZOLINE HYDROCHLORIDE 0.05 G/100ML
2 SPRAY NASAL 2 TIMES DAILY
Status: DISCONTINUED | OUTPATIENT
Start: 2025-02-24 | End: 2025-02-24 | Stop reason: HOSPADM

## 2025-02-24 RX ADMIN — OXYMETAZOLINE HYDROCHLORIDE 2 SPRAY: 0.05 SPRAY NASAL at 15:39

## 2025-02-24 ASSESSMENT — COLUMBIA-SUICIDE SEVERITY RATING SCALE - C-SSRS
1. IN THE PAST MONTH, HAVE YOU WISHED YOU WERE DEAD OR WISHED YOU COULD GO TO SLEEP AND NOT WAKE UP?: NO
2. HAVE YOU ACTUALLY HAD ANY THOUGHTS OF KILLING YOURSELF IN THE PAST MONTH?: NO
6. HAVE YOU EVER DONE ANYTHING, STARTED TO DO ANYTHING, OR PREPARED TO DO ANYTHING TO END YOUR LIFE?: NO

## 2025-02-24 ASSESSMENT — ENCOUNTER SYMPTOMS
SORE THROAT: 0
CONFUSION: 0
DIZZINESS: 0
COLOR CHANGE: 0
WEAKNESS: 0
LIGHT-HEADEDNESS: 0

## 2025-02-24 ASSESSMENT — ACTIVITIES OF DAILY LIVING (ADL)
ADLS_ACUITY_SCORE: 41

## 2025-02-24 NOTE — ED PROVIDER NOTES
History     Chief Complaint   Patient presents with    Epistaxis     X 2 hours     The history is provided by the patient.     Josué Seymour is a 65 year old male with history of ischemic right MCA stroke on Eliquis and T2DM who presents to the ED via EMS for three days of fluctuating nosebleeds from the right nostril. He states that the bleeding would stop and then resume when sneezing.  He has had frequent nosebleeds before but cannot remember how they were treated. Denies lightheadedness, hemoptysis or pain. No other concerns today.    Allergies:  No Known Allergies    Problem List:    Patient Active Problem List    Diagnosis Date Noted    Polyneuropathy, peripheral sensorimotor axonal 01/11/2025     Priority: Medium    COPD (chronic obstructive pulmonary disease) (H) 12/23/2019     Priority: Medium     4/3/2019 Hospital discharge summary:  Patient has Chronic obstructive pulmonary disease/cough and tobacco abuse. He is ready to quit Start with the nicotine patch.  Consider chantix if unsuccessful. He has an albuterol MDI. CXR was negative      Controlled type 2 diabetes mellitus with complication, without long-term current use of insulin (H) 10/27/2019     Priority: Medium    Abnormality of gait following cerebrovascular accident (CVA) 04/04/2019     Priority: Medium    CKD (chronic kidney disease) stage 3, GFR 30-59 ml/min (H) 04/01/2019     Priority: Medium    CVA (cerebral vascular accident) (H) 04/01/2019     Priority: Medium    Proteinuria, unspecified type 12/11/2018     Priority: Medium    Left renal atrophy 12/11/2018     Priority: Medium    Chronic bilateral low back pain without sciatica 05/08/2018     Priority: Medium    Peripheral neuropathy 01/05/2018     Priority: Medium    Chronic diastolic congestive heart failure (H) 10/20/2017     Priority: Medium    Spleen injury 10/13/2017     Priority: Medium     Possible grade 1      Bifascicular bundle branch block 07/05/2017     Priority: Medium    QT  prolongation 07/05/2017     Priority: Medium    Macrocytosis without anemia 07/05/2017     Priority: Medium    HTN (hypertension) 11/05/2014     Priority: Medium      Past Medical History:    Past Medical History:   Diagnosis Date    Abnormal electrocardiogram     Abnormal levels of other serum enzymes     Abnormality of plasma protein     Acute pancreatitis without infection or necrosis     Bifascicular block     Calculus of gallbladder without cholecystitis without obstruction     Cerebral infarction (H)     Dehydration     Epigastric pain     Essential (primary) hypertension     Hematuria     Hypertensive urgency     Hypokalemia     Malignant neoplasm of bladder (H)     Muscle spasm of back     Nicotine dependence, uncomplicated     Other specified abnormal findings of blood chemistry     Other specified abnormal findings of blood chemistry     Other specified diseases of blood and blood-forming organs     Pure hyperglyceridemia     Restless legs syndrome      Past Surgical History:    Past Surgical History:   Procedure Laterality Date    OTHER SURGICAL HISTORY      2014,[id number],BLADDER SURGERY,tumor resection-- records from [facility]    OTHER SURGICAL HISTORY      02/27/2015,[id number],CAROTID ENDARTERECTOMY,Right,Records from [facility]    OTHER SURGICAL HISTORY      08/28/2009,39612.0,[place] EGD TRANSORAL DIAGNOSTIC,records from [facility]    OTHER SURGICAL HISTORY      08/03/2009,33768.0,[place] LAP CHOLECYSTECTOMY/GRAPH,[facility]    TONSILLECTOMY      Records from [facility]     Family History:    History reviewed. No pertinent family history.    Social History:  Marital Status:   [2]  Social History     Tobacco Use    Smoking status: Former    Smokeless tobacco: Never   Substance Use Topics    Alcohol use: No    Drug use: Unknown     Types: Other     Comment: Drug use: No      Medications:    acetaminophen (TYLENOL) 325 MG tablet  albuterol (PROAIR HFA/PROVENTIL HFA/VENTOLIN HFA) 108 (90 BASE) MCG/ACT Inhaler  amLODIPine  "(NORVASC) 5 MG tablet  aspirin  MG EC tablet  HYDROcodone-acetaminophen (NORCO) 5-325 MG per tablet  metoprolol tartrate (LOPRESSOR) 50 MG tablet  ranitidine (ZANTAC) 300 MG tablet      Review of Systems   HENT:  Positive for nosebleeds. Negative for sore throat.    Skin:  Negative for color change.   Neurological:  Negative for dizziness, weakness and light-headedness.   Psychiatric/Behavioral:  Negative for confusion.      Physical Exam   Pulse: 73  Resp: 16  Height: 177.8 cm (5' 10\")  Weight: 74.8 kg (165 lb)  SpO2: 96 %    Physical Exam  Constitutional:       General: He is not in acute distress.     Appearance: Normal appearance.   HENT:      Head: Normocephalic and atraumatic.      Nose:      Right Nostril: Epistaxis present.      Comments: Small clots with mucinous blood removed via tissue  Neurological:      Mental Status: He is alert.       No results found for this or any previous visit (from the past 24 hours).    Medications   oxymetazoline (AFRIN) 0.05 % spray 2 spray ( Both Nostrils Canceled Entry 2/24/25 1730)     Assessments & Plan (with Medical Decision Making)  Josué Seymour is a 65 year old male with history of ischemic right MCA stroke on Eliquis and T2DM who presents to the ED via EMS for three days of fluctuating nosebleeds from the right nostril. He states that the bleeding would stop and then resume when sneezing.  He has had frequent nosebleeds before but cannot remember how they were treated. Denies lightheadedness, hemoptysis or pain. No other concerns today.  Vital signs in the ED Pulse 73   Resp 16   Ht 1.778 m (5' 10\")   Wt 74.8 kg (165 lb)   SpO2 96%   BMI 23.68 kg/m    We decided to treat this with Afrin. We had the patient blow their nose to remove all the clots, spit up clots and cough out clots. Once all the clots were out we put six shots of Afrin in the affected side and cleaned off their face. We replaced the nose clip at 3:35 pm and will wait 20 minutes to reevaluate. "   4:00 pm - A light trickle of blood still present from right nostril. Administered another dose of Afrin with reapplication of nose clip. Will reevaluate in 20 minutes.  4:25 pm - Upon reevaluation, right nostril is no longer bleeding. Will let sit for 20 minutes without nose clip and check again.  4:45 pm - Still no active bleeding. No postnasal blood or evidence of irritation. No obvious source of bleeding.  Communicated with patient to continue his Eliquis and use Afrin as needed for nosebleeds. Return precautions discussed, including uncontrollable epistaxis without improvement using Afrin at home. Patient in agreement with the plan. Discharge back to [facility].     I have reviewed the nursing notes.    I have reviewed the findings, diagnosis, plan and need for follow up with the patient.  Medical Decision Making  The patient's presentation was of low complexity (2+ clearly self-limited or minor problems).    The patient's evaluation involved:  an assessment requiring an independent historian (see separate area of note for details)    The patient's management necessitated moderate risk (prescription drug management including medications given in the ED).    Final diagnoses:   Epistaxis - right nares     Karen Mayo, MS3    2/24/2025   [facility]    I am seeing this patient with Karen [facility].  I agree with her evaluation, work up, differential and diagnosis.  I did see the patient in person today.   MD Guzman Nguyen, Darnell Urena MD  02/24/25 7523

## 2025-02-24 NOTE — DISCHARGE INSTRUCTIONS
Josué    Do not stop your Eliquis.    Thank you for choosing our Emergency Department for your care.     You may receive a phone call or letter for a survey about your care in our ED.  Please complete this as this is how we improve care for our patients.     If you have any questions after leaving the ED you can call or text me on my cell phone at [phone].  I am not on call so if I do not answer my phone please leave a message- I will get back to you.  If you are not doing well please return to the ED.     Sincerely,    Dr Darwin Hinds M.D.  Medical Director  [facility] Emergency Department

## 2025-02-24 NOTE — ED TRIAGE NOTES
"Pt arrives via EMS with a c/o epistaxis.   Pt has had nose bleed x 3 days off and on. Staff states that it would stop bleeding and then he would sneeze and t starts again. Pt is on eliquis bid. Hx CVA    Vital signs:        Pulse: 73   Resp: 16 SpO2: 96 %     Height: 177.8 cm (5' 10\") Weight: 74.8 kg (165 lb)  Estimated body mass index is 23.68 kg/m  as calculated from the following:    Height as of this encounter: 1.778 m (5' 10\").    Weight as of this encounter: 74.8 kg (165 lb).        Triage Assessment (Adult)       Row Name 02/24/25 1533          Triage Assessment    Airway WDL WDL        Respiratory WDL    Respiratory WDL WDL        Skin Circulation/Temperature WDL    Skin Circulation/Temperature WDL WDL        Cardiac WDL    Cardiac WDL WDL        Peripheral/Neurovascular WDL    Peripheral Neurovascular WDL WDL        Jaime Coma Scale    Best Eye Response 4-->(E4) spontaneous     Best Motor Response 6-->(M6) obeys commands     Best Verbal Response 5-->(V5) oriented     Jaime Coma Scale Score 15                     "

## 2025-02-24 NOTE — PROGRESS NOTES
Multiple calls placed to [facility] with no answer. [facility] department called to have a deputy go out to facility

## 2025-02-24 NOTE — ED NOTES
Attempted to call care facility with no answer, lm for them to call back re: returning pt to care facility. Attempted to call pt's brother also for transportation, phone number is disconnected.

## 2025-04-08 ENCOUNTER — LAB REQUISITION (OUTPATIENT)
Dept: LAB | Facility: OTHER | Age: 65
End: 2025-04-08

## 2025-04-08 DIAGNOSIS — I50.32 CHRONIC DIASTOLIC (CONGESTIVE) HEART FAILURE (H): ICD-10-CM

## 2025-04-08 LAB — MAGNESIUM SERPL-MCNC: 2 MG/DL (ref 1.7–2.3)

## 2025-04-08 PROCEDURE — 82306 VITAMIN D 25 HYDROXY: CPT

## 2025-04-08 PROCEDURE — 83735 ASSAY OF MAGNESIUM: CPT

## 2025-04-09 LAB — VIT D+METAB SERPL-MCNC: 48 NG/ML (ref 20–50)

## 2025-05-14 ENCOUNTER — HOSPITAL ENCOUNTER (EMERGENCY)
Facility: OTHER | Age: 65
Discharge: OTHER INSTITUTION | End: 2025-05-14
Attending: STUDENT IN AN ORGANIZED HEALTH CARE EDUCATION/TRAINING PROGRAM
Payer: COMMERCIAL

## 2025-05-14 VITALS
RESPIRATION RATE: 16 BRPM | BODY MASS INDEX: 23.62 KG/M2 | OXYGEN SATURATION: 93 % | SYSTOLIC BLOOD PRESSURE: 150 MMHG | TEMPERATURE: 98 F | HEART RATE: 55 BPM | HEIGHT: 70 IN | WEIGHT: 165 LBS | DIASTOLIC BLOOD PRESSURE: 67 MMHG

## 2025-05-14 DIAGNOSIS — T83.9XXA FOLEY CATHETER PROBLEM, INITIAL ENCOUNTER: ICD-10-CM

## 2025-05-14 PROCEDURE — 99282 EMERGENCY DEPT VISIT SF MDM: CPT | Performed by: STUDENT IN AN ORGANIZED HEALTH CARE EDUCATION/TRAINING PROGRAM

## 2025-05-14 PROCEDURE — 99283 EMERGENCY DEPT VISIT LOW MDM: CPT | Mod: 25 | Performed by: STUDENT IN AN ORGANIZED HEALTH CARE EDUCATION/TRAINING PROGRAM

## 2025-05-14 PROCEDURE — 51702 INSERT TEMP BLADDER CATH: CPT | Performed by: STUDENT IN AN ORGANIZED HEALTH CARE EDUCATION/TRAINING PROGRAM

## 2025-05-14 ASSESSMENT — COLUMBIA-SUICIDE SEVERITY RATING SCALE - C-SSRS
6. HAVE YOU EVER DONE ANYTHING, STARTED TO DO ANYTHING, OR PREPARED TO DO ANYTHING TO END YOUR LIFE?: NO
2. HAVE YOU ACTUALLY HAD ANY THOUGHTS OF KILLING YOURSELF IN THE PAST MONTH?: NO
1. IN THE PAST MONTH, HAVE YOU WISHED YOU WERE DEAD OR WISHED YOU COULD GO TO SLEEP AND NOT WAKE UP?: NO

## 2025-05-14 ASSESSMENT — ACTIVITIES OF DAILY LIVING (ADL): ADLS_ACUITY_SCORE: 41

## 2025-05-14 NOTE — ED PROVIDER NOTES
"Emergency Department Provider Note  : 1960 Age: 65 year old Sex: male MRN: [id number]    Chief Complaint   Patient presents with    Catheter Problem       Medical Decision Making / Assessment / Plan   Josué is a 65 year old male with a past medical history of chronic indwelling Aranda catheter who presented to the Emergency Department due to inability to place Aranda catheter at his living facility.  RN replaced his Aranda catheter at bedside with good urine return.  He will be discharged back to his living facility.    The patient was informed of the plan and verbalized understanding and agreed with the plan. The patient was given strict return to Emergency Department precautions as well as appropriate follow up instructions. The patient was discharged in stable condition.    New Prescriptions    No medications on file       Final diagnoses:   Aranda catheter problem, initial encounter       Jasper Sim MD  2025   Emergency Department    Subjective   Josué is a 65 year old male who presents at  4:25 PM with inability to place Aranda catheter at Nursing facility.  Patient states that they were unable to get catheter x 4 attempts today.  He was brought to the emergency department by EMS for catheter replacement.  Follows with [facility].        I have reviewed the Medications, Allergies, Past Medical and Surgical History, and Social History in the [facility] and with family.    Review of Systems:  Please see Subjective / HPI for pertinent positives and negatives. All other systems reviewed and found to be negative.      Objective     Patient Vitals for the past 24 hrs:   BP Temp Temp src Pulse Resp SpO2 Height Weight   25 1626 (!) 150/67 98  F (36.7  C) Tympanic 55 16 93 % 1.778 m (5' 10\") 74.8 kg (165 lb)         Physical Exam:   Pleasant 65-year-old man lying in bed no acute distress  : Normal external genitalia      Procedures / Critical Care   Procedures    Aggregate Critical Care Time: " None.     No orders of the defined types were placed in this encounter.      RESULTS:  No results found for any visits on 05/14/25.            Medical/Surgical History:  Past Medical History:   Diagnosis Date    Abnormal electrocardiogram     No Comments Provided    Abnormal levels of other serum enzymes     No Comments Provided    Abnormality of plasma protein     No Comments Provided    Acute pancreatitis without infection or necrosis     No Comments Provided    Bifascicular block     No Comments Provided    Calculus of gallbladder without cholecystitis without obstruction     No Comments Provided    Cerebral infarction (H)     No Comments Provided    Dehydration     No Comments Provided    Epigastric pain     No Comments Provided    Essential (primary) hypertension     No Comments Provided    Hematuria     No Comments Provided    Hypertensive urgency     No Comments Provided    Hypokalemia     No Comments Provided    Malignant neoplasm of bladder (H)     No Comments Provided    Muscle spasm of back     No Comments Provided    Nicotine dependence, uncomplicated     No Comments Provided    Other specified abnormal findings of blood chemistry     No Comments Provided    Other specified abnormal findings of blood chemistry     No Comments Provided    Other specified diseases of blood and blood-forming organs     No Comments Provided    Pure hyperglyceridemia     No Comments Provided    Restless legs syndrome     No Comments Provided     Past Surgical History:   Procedure Laterality Date    OTHER SURGICAL HISTORY      2014,[id number],BLADDER SURGERY,tumor resection-- records from [facility]    OTHER SURGICAL HISTORY      02/27/2015,[id number],CAROTID ENDARTERECTOMY,Right,Records from [facility]    OTHER SURGICAL HISTORY      08/28/2009,25882.0,[place] EGD TRANSORAL DIAGNOSTIC,records from [facility]    OTHER SURGICAL HISTORY      08/03/2009,63274.0,[place] LAP CHOLECYSTECTOMY/GRAPH,[facility]    TONSILLECTOMY      Records from [facility]        Medications:  No current facility-administered medications for this encounter.     Current Outpatient Medications   Medication Sig Dispense Refill    acetaminophen (TYLENOL) 325 MG tablet Take 650 mg by mouth every 4 hours as needed Max acetaminophen dose 4000mg in 24 hrs      albuterol (PROAIR HFA/PROVENTIL HFA/VENTOLIN HFA) 108 (90 BASE) MCG/ACT Inhaler Inhale 2 puffs into the lungs every 6 hours as needed      amLODIPine (NORVASC) 5 MG tablet Take 5 mg by mouth daily      aspirin  MG EC tablet Take 325 mg by mouth daily with food      HYDROcodone-acetaminophen (NORCO) 5-325 MG per tablet Take 1 tablet by mouth every 4 hours as needed for pain Max acetaminophen dose 4000mg in 24hrs      metoprolol tartrate (LOPRESSOR) 50 MG tablet Take 50 mg by mouth 2 times daily      ranitidine (ZANTAC) 300 MG tablet Take 300 mg by mouth At Bedtime         Allergies:  Patient has no known allergies.    Relevant labs, images, EKGs, Epic and outside hospital (if applicable) charts were reviewed. The findings, diagnosis, plan, and need for follow up were discussed with the patient/family. Nursing notes were reviewed.        Jasper Sim MD  05/14/25 [id number]

## 2025-05-14 NOTE — ED TRIAGE NOTES
Pt here by EMS into [facility], EMS reports that pt had a routine catheter changed today and nurses were unable to replace catheter, pt has a hx of an enlarged prostate and often has to have urology replace it, VSS     Triage Assessment (Adult)       Row Name 05/14/25 2027          Triage Assessment    Airway WDL WDL        Respiratory WDL    Respiratory WDL WDL        Skin Circulation/Temperature WDL    Skin Circulation/Temperature WDL WDL        Cardiac WDL    Cardiac WDL WDL        Peripheral/Neurovascular WDL    Peripheral Neurovascular WDL WDL        Cognitive/Neuro/Behavioral WDL    Cognitive/Neuro/Behavioral WDL WDL

## 2025-07-01 ENCOUNTER — LAB REQUISITION (OUTPATIENT)
Dept: LAB | Facility: OTHER | Age: 65
End: 2025-07-01
Payer: COMMERCIAL

## 2025-07-01 DIAGNOSIS — E11.22 TYPE 2 DIABETES MELLITUS WITH DIABETIC CHRONIC KIDNEY DISEASE (H): ICD-10-CM

## 2025-07-01 LAB
ANION GAP SERPL CALCULATED.3IONS-SCNC: 12 MMOL/L (ref 7–15)
BUN SERPL-MCNC: 36 MG/DL (ref 8–23)
CALCIUM SERPL-MCNC: 8.8 MG/DL (ref 8.8–10.4)
CHLORIDE SERPL-SCNC: 104 MMOL/L (ref 98–107)
CREAT SERPL-MCNC: 2.16 MG/DL (ref 0.67–1.17)
EGFRCR SERPLBLD CKD-EPI 2021: 33 ML/MIN/1.73M2
EST. AVERAGE GLUCOSE BLD GHB EST-MCNC: 134 MG/DL
GLUCOSE SERPL-MCNC: 103 MG/DL (ref 70–99)
HBA1C MFR BLD: 6.3 %
HCO3 SERPL-SCNC: 23 MMOL/L (ref 22–29)
POTASSIUM SERPL-SCNC: 4.6 MMOL/L (ref 3.4–5.3)
SODIUM SERPL-SCNC: 139 MMOL/L (ref 135–145)

## 2025-07-01 PROCEDURE — 83036 HEMOGLOBIN GLYCOSYLATED A1C: CPT | Performed by: NURSE PRACTITIONER

## 2025-07-01 PROCEDURE — 80048 BASIC METABOLIC PNL TOTAL CA: CPT | Performed by: NURSE PRACTITIONER

## 2025-07-01 PROCEDURE — 36415 COLL VENOUS BLD VENIPUNCTURE: CPT | Performed by: NURSE PRACTITIONER

## (undated) RX ORDER — PIPERACILLIN SODIUM, TAZOBACTAM SODIUM 3; .375 G/15ML; G/15ML
INJECTION, POWDER, LYOPHILIZED, FOR SOLUTION INTRAVENOUS
Status: DISPENSED
Start: 2025-01-11

## (undated) RX ORDER — AZITHROMYCIN 500 MG/5ML
INJECTION, POWDER, LYOPHILIZED, FOR SOLUTION INTRAVENOUS
Status: DISPENSED
Start: 2025-01-11

## (undated) RX ORDER — OXYMETAZOLINE HYDROCHLORIDE 0.05 G/100ML
SPRAY NASAL
Status: DISPENSED
Start: 2025-02-24